# Patient Record
Sex: FEMALE | Race: WHITE | Employment: FULL TIME | URBAN - METROPOLITAN AREA
[De-identification: names, ages, dates, MRNs, and addresses within clinical notes are randomized per-mention and may not be internally consistent; named-entity substitution may affect disease eponyms.]

---

## 2020-07-13 ENCOUNTER — HOSPITAL ENCOUNTER (EMERGENCY)
Facility: HOSPITAL | Age: 35
Discharge: HOME | End: 2020-07-13
Attending: EMERGENCY MEDICINE
Payer: COMMERCIAL

## 2020-07-13 VITALS
BODY MASS INDEX: 23.05 KG/M2 | HEART RATE: 80 BPM | HEIGHT: 64 IN | RESPIRATION RATE: 16 BRPM | OXYGEN SATURATION: 100 % | SYSTOLIC BLOOD PRESSURE: 132 MMHG | DIASTOLIC BLOOD PRESSURE: 82 MMHG | WEIGHT: 135 LBS | TEMPERATURE: 98 F
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
## 2020-07-13 PROCEDURE — 99283 EMERGENCY DEPT VISIT LOW MDM: CPT

## 2020-07-13 RX ORDER — PREDNISONE 20 MG/1
40 TABLET ORAL DAILY
Qty: 10 TABLET | Refills: 0 | Status: SHIPPED | OUTPATIENT
Start: 2020-07-13 | End: 2020-07-18

## 2020-07-13 RX ORDER — PREDNISONE 20 MG/1
40 TABLET ORAL DAILY
Qty: 10 TABLET | Refills: 0 | Status: SHIPPED | OUTPATIENT
Start: 2020-07-13 | End: 2020-07-13

## 2020-07-14 NOTE — ED PROVIDER NOTES
Patient Seen in: [facility] Emergency Department      History   Patient presents with:  Rash    Stated Complaint:     HPI  29 yo female presents for itchy bug bites for the possible 5 days that she feels are getting more swollen and red.   She denies less than 2 seconds. Findings: Rash (Erythematous raised urticaria wheals to left inner thigh and lower back with no vesicles) present. Neurological:      Mental Status: She is alert and oriented to person, place, and time. Psychiatric:          Delynn Medici

## 2024-08-17 ENCOUNTER — APPOINTMENT (OUTPATIENT)
Dept: CT IMAGING | Facility: HOSPITAL | Age: 39
End: 2024-08-17
Attending: EMERGENCY MEDICINE
Payer: COMMERCIAL

## 2024-08-17 ENCOUNTER — HOSPITAL ENCOUNTER (EMERGENCY)
Facility: HOSPITAL | Age: 39
Discharge: HOME | End: 2024-08-17
Attending: EMERGENCY MEDICINE
Payer: COMMERCIAL

## 2024-08-17 VITALS
DIASTOLIC BLOOD PRESSURE: 75 MMHG | RESPIRATION RATE: 18 BRPM | TEMPERATURE: 98 F | OXYGEN SATURATION: 100 % | HEIGHT: 64 IN | BODY MASS INDEX: 23.05 KG/M2 | HEART RATE: 84 BPM | WEIGHT: 135 LBS | SYSTOLIC BLOOD PRESSURE: 136 MMHG

## 2024-08-17 DIAGNOSIS — S05.12XA PERIORBITAL CONTUSION OF LEFT EYE, INITIAL ENCOUNTER: ICD-10-CM

## 2024-08-17 DIAGNOSIS — S00.83XA: ICD-10-CM

## 2024-08-17 DIAGNOSIS — S06.0X0A CONCUSSION WITHOUT LOSS OF CONSCIOUSNESS, INITIAL ENCOUNTER: Primary | ICD-10-CM

## 2024-08-17 PROCEDURE — 99284 EMERGENCY DEPT VISIT MOD MDM: CPT

## 2024-08-17 PROCEDURE — 70450 CT HEAD/BRAIN W/O DYE: CPT | Performed by: EMERGENCY MEDICINE

## 2024-08-17 PROCEDURE — 70486 CT MAXILLOFACIAL W/O DYE: CPT | Performed by: EMERGENCY MEDICINE

## 2024-08-17 NOTE — ED PROVIDER NOTES
Patient Seen in: [facility] Emergency Department      History     Chief Complaint   Patient presents with    Head Injury    Trauma     Stated Complaint: hit playing basketball with an elbow, no loc, no vomiting    Subjective:   HPI    38-year-old female presents reporting headache, dizziness.  She says she was playing basketball and she was elbowed twice in the head and face in quick succession.  Patient's teammates state that she was acting \"loopy\" after the head injury so they made her go to the emergency room to get checked out.  She said she remembers feeling very out of it and dazed after the injury but did not lose consciousness.  She currently reports headache with dizziness, photophobia, mild nausea.  No vomiting reported.  Denies any neck or back pain.  She reports pain to the left mandible and left orbit.    Objective:   History reviewed. No pertinent past medical history.           History reviewed. No pertinent surgical history.             Social History     Socioeconomic History    Marital status: Single   Tobacco Use    Smoking status: Never    Smokeless tobacco: Never   Vaping Use    Vaping status: Never Used   Substance and Sexual Activity    Alcohol use: Yes     Comment: occasional    Drug use: Never     Social Determinants of Health     Food Insecurity: Low Risk  (12/18/2023)    Received from [facility]    Food Insecurity     Have there been times that your food ran out, and you didn't have money to get more?: No     Are there times that you worry that this might happen?: No   Transportation Needs: Low Risk  (12/18/2023)    Received from [facility]    Transportation Needs     Do you have trouble getting transportation to medical appointments?: No     How do you normally get to and from your appointments?: Other              Review of Systems    Positive for stated Chief Complaint: Head Injury and Trauma    Other systems are as noted in  HPI.  Constitutional and vital signs reviewed.      All other systems reviewed and negative except as noted above.    Physical Exam     ED Triage Vitals [08/17/24 1501]   /85   Pulse 96   Resp 17   Temp 98.2 °F (36.8 °C)   Temp src Temporal   SpO2 100 %   O2 Device None (Room air)       Current Vitals:   Vital Signs  BP: 123/85  Pulse: 96  Resp: 17  Temp: 98.2 °F (36.8 °C)  Temp src: Temporal    Oxygen Therapy  SpO2: 100 %  O2 Device: None (Room air)            Physical Exam    General:  Vitals as listed.  No acute distress   HEENT: Ecchymosis in the left periorbital region.  Some swelling and tenderness over the left TMJ.  No hemotympanum bilaterally.  No Puentes sign.  No palpable skull fractures.  Pupils equal, round, reactive bilaterally.  Extraocular movement is intact and there is no evidence of entrapment..  No deformity to the mandible.  Neck: Full range of motion without discomfort.  No bony tenderness on palpation.  Lungs: good air exchange and clear   Heart: regular rate rhythm and no murmur   Abdomen: Soft and nontender.  No abdominal masses.  No peritoneal signs   Extremities: no edema, normal peripheral pulses   Neuro: Alert oriented and nonfocal   Skin: no rashes or nodules    ED Course   Labs Reviewed - No data to display          CT FACIAL BONES (CPT=70486)    Result Date: 8/17/2024  PROCEDURE:  CT FACIAL BONES (CPT=70486)  COMPARISON:  None.  INDICATIONS:  hit playing basketball with an elbow, no loc, no vomiting  TECHNIQUE:  Noncontrast CT scanning is performed through the facial bones. 3D shaded surface renderings are created on an independent CT scanner workstation. Dose reduction techniques were used. Dose information is transmitted to the ACR (American College of Radiology) NRDR (National Radiology Data Registry) which includes the Dose Index Registry.  3-D RENDERING:  Three dimensional image processing was completed using a separate workstation under concurrent supervision. Images were  archived.  PATIENT STATED HISTORY:(As transcribed by Technologist)  Patient got hit around the left eye while playing basketball.    FINDINGS:  SINUSES:  Mucous retention cyst or polyp in the left maxillary sinus measures 19 mm. NASAL FOSSA:  No mass, fracture, or significant septal deviation.  SKULL BASE:  No mass or bone destruction.  FACIAL BONES:  No bony lesion or fracture  ORBITS:  No visible mass, hematoma, edema or fracture.  CAVERNOUS SINUS:  Symmetric appearance with no visible lesion.  SALIVARY GLANDS:  The parotid and submandibular glands are unremarkable.  OTHER:  The nasopharynx, oropharynx, and oral cavity are unremarkable.  No lymphadenopathy.             CONCLUSION:  No evidence of acute facial bone fracture.   LOCATION:  [facility]   Dictated by (CST): Carlos Castle MD on 8/17/2024 at 5:15 PM     Finalized by (CST): Carlos Castle MD on 8/17/2024 at 5:17 PM       CT BRAIN OR HEAD (CPT=70450)    Result Date: 8/17/2024  PROCEDURE:  CT BRAIN OR HEAD (97034)  COMPARISON:  None.  INDICATIONS:  hit playing basketball with an elbow, no loc, no vomiting.  Headache.  TECHNIQUE:  Noncontrast CT scanning is performed through the brain. Dose reduction techniques were used. Dose information is transmitted to the ACR (American College of Radiology) NRDR (National Radiology Data Registry) which includes the Dose Index Registry.  PATIENT STATED HISTORY: (As transcribed by Technologist)  Patient got hit around the left eye while playing basketball.    FINDINGS:  VENTRICLES/SULCI:  Ventricles and sulci are normal in size.  INTRACRANIAL:  There are no abnormal extraaxial fluid collections.  There is no midline shift.  There are no intraparenchymal brain abnormalities.  There is nothing specific for acute infarct.  There is no hemorrhage or mass lesion.  SINUSES:           No sign of acute sinusitis.  MASTOIDS:          No sign of acute inflammation. SKULL:             No evidence for fracture or osseous abnormality.  OTHER:             None.            CONCLUSION:  No acute intracranial process.    LOCATION:  [facility]   Dictated by (CST): Carlos Castle MD on 8/17/2024 at 5:05 PM     Finalized by (CST): Carlos Castle MD on 8/17/2024 at 5:07 PM               MDM      38-year-old female presents after she had altered mental status after head injury during a basketball game.  She said she is feeling much better.  She is awake, alert, oriented at this time.    Differential includes but is not limited to concussion, orbital fracture, mandible fracture, intracranial hemorrhage, a life threat.    CT brain, CT facial bones ordered for further evaluation.    My independent interpretation of CT of the brain is that there is no acute intracranial hemorrhage.    Radiology reports no acute process and CT of the brain.  Also reporting no evidence of acute facial bone fracture on CT facial bones.    She does have left periorbital contusion with ecchymosis.  Swelling and tenderness over the left TMJ consistent with contusion to the mandible.  She is otherwise well-appearing at this time.  Has some symptoms consistent with concussion including headache, photophobia, dizziness.                                       Medical Decision Making      Disposition and Plan     Clinical Impression:  1. Concussion without loss of consciousness, initial encounter    2. Periorbital contusion of left eye, initial encounter    3. TMJ contusion, initial encounter         Disposition:  Discharge  8/17/2024  5:47 pm    Follow-up:  Zara Blackwood MD  [facility]  [address] [address] [phone]    Follow up  Neurology specialist for further evaluation of concussion symptoms    [facility] Emergency Department  [address] [facility] [address] [phone]  Follow up  If symptoms worsen          Medications Prescribed:  There are no discharge medications for this patient.

## 2024-08-17 NOTE — ED INITIAL ASSESSMENT (HPI)
Patient with swelling to left eye and upper cheek s/p being hit in the eye twice while playing basketball. Went to an urgent care earlier and was told to come to the ER.